# Patient Record
Sex: FEMALE
[De-identification: names, ages, dates, MRNs, and addresses within clinical notes are randomized per-mention and may not be internally consistent; named-entity substitution may affect disease eponyms.]

---

## 2019-12-03 ENCOUNTER — RECORDS - HEALTHEAST (OUTPATIENT)
Dept: ADMINISTRATIVE | Facility: OTHER | Age: 59
End: 2019-12-03

## 2020-01-02 ENCOUNTER — TELEPHONE (OUTPATIENT)
Dept: ONCOLOGY | Facility: CLINIC | Age: 60
End: 2020-01-02

## 2020-01-02 NOTE — TELEPHONE ENCOUNTER
"I called Mary to schedule a NP Onc appt as requested via [employer] from [place] (referrals) Mary did not answer & no voicemail available to leave a message. Pt is being referred by [facility] for \"malignant carcinoid tumor.\" I sent a letter requesting a call to schedule also.  "

## 2020-01-15 NOTE — TELEPHONE ENCOUNTER
ONCOLOGY INTAKE: Records Information      APPT INFORMATION: [id number] Dr. Carrington Arredondo  Referring provider:  Dr. Tavares Kirby  Referring provider s clinic:  [facility]  Reason for visit/diagnosis:  GI Neuroendocrine tumor  Has patient been notified of appointment date and time?: Yes    RECORDS INFORMATION:  Were the records received with the referral (via [employer])? No    Has patient been seen for any external appt for this diagnosis? Yes    If yes, where? [facility]    Has patient had any imaging or procedures outside of [facility] for this condition? Yes      If Yes, where? [facility]    ADDITIONAL INFORMATION:  None

## 2020-01-16 NOTE — TELEPHONE ENCOUNTER
Action    Action Taken January 16, 2020  LVM for PT to CB and verify records Hx  January 17, 2020  Call from Juan @ [facility] stating that all images will be uploaded and sent priority mail today and that I need to request pathology from [facility] as they don't have it there.  1:48 PM  CB from PT verifying all records are at [place] and that the recent PET was at [facility]     RECORDS STATUS - ALL OTHER DIAGNOSIS      RECORDS RECEIVED FROM: Baldo ([facility]); [facility]   DATE RECEIVED: Care Everywhere   NOTES STATUS DETAILS   OFFICE NOTE from referring provider     OFFICE NOTE from medical oncologist     DISCHARGE SUMMARY from hospital     DISCHARGE REPORT from the ER     OPERATIVE REPORT     MEDICATION LIST     CLINICAL TRIAL TREATMENTS TO DATE     LABS     PATHOLOGY REPORTS     ANYTHING RELATED TO DIAGNOSIS Re-requested 1/17/20  Received 1/29/20 [facility]   GENONOMIC TESTING     TYPE:     IMAGING (NEED IMAGES & REPORT)     CT SCANS Requested 1/16/20  Received - See note 1/24/20 [facility]    MRI Requested 1/16/20  Received - See note 1/24/20 [facility]    MAMMO     ULTRASOUND Requested 1/16/20  Received - See note 1/24/20 [facility]    PET Requested 1/16/20  2nd request on 1/29/20 [facility]

## 2020-01-24 ENCOUNTER — TELEPHONE (OUTPATIENT)
Dept: ONCOLOGY | Facility: CLINIC | Age: 60
End: 2020-01-24

## 2020-01-24 NOTE — TELEPHONE ENCOUNTER
Imaging disc arrived from [facility] 1/23/20 - given to Lionel to be uploaded into PACs on 1/24/20    -1/30/20: Images from [facility] resolved, and now viewable to PACS - [facility]  7:51 AM

## 2020-01-24 NOTE — TELEPHONE ENCOUNTER
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 Referring provider is Dr Gutierrez at [facility]. Liver bx Nov 2019 shows NET with GI origin, CT CAP showed intraheptic & intrathoracic adenopathy. Bronch with bx of intrathoracic LN negative for malignancy Nov 2019. Dotatate PET Jan 2020 at [facility] [place]. Pt confirms she started lanreotide at [place] on 1/6/20 & has surgical consult with Dr Regalado at [facility] 1/27.   Pt sees Dr Arredondo 2/6 for 2nd opinion; pt understands that we may also advocate for surgical consult here. Will consult with Onc team and contact pt only if further consults are ordered here. Pt has my direct# PRN.

## 2020-01-28 NOTE — TELEPHONE ENCOUNTER
Per Temi Stanton RNCC, pt saw Dr Regalado/Gen Surg 1/27 at [facility] & surgery was NOT recommended. Dr Gray agrees with Dr Regalado's recs. Pt to see Dr Arredondo 2/6, if Dr Arredondo feels surg onc consult is still warranted at [facility], he will send referral.

## 2020-01-30 ENCOUNTER — TELEPHONE (OUTPATIENT)
Dept: ONCOLOGY | Facility: CLINIC | Age: 60
End: 2020-01-30

## 2020-01-30 PROCEDURE — 00000346 ZZHCL STATISTIC REVIEW OUTSIDE SLIDES TC 88321: Performed by: INTERNAL MEDICINE

## 2020-01-30 NOTE — TELEPHONE ENCOUNTER
I called Mary to offer an appt for tomorrow 1/31/20 with Dr Arredondo as requested via IB from [place]. Mary did not answer so I left a voicemail stating there was an opening tomorrow, but did not leave details & requested a return call.

## 2020-01-31 LAB — COPATH REPORT: NORMAL

## 2020-02-06 ENCOUNTER — PRE VISIT (OUTPATIENT)
Dept: ONCOLOGY | Facility: CLINIC | Age: 60
End: 2020-02-06

## 2020-02-06 ENCOUNTER — ONCOLOGY VISIT (OUTPATIENT)
Dept: ONCOLOGY | Facility: CLINIC | Age: 60
End: 2020-02-06
Attending: INTERNAL MEDICINE
Payer: COMMERCIAL

## 2020-02-06 VITALS
HEART RATE: 83 BPM | RESPIRATION RATE: 16 BRPM | OXYGEN SATURATION: 98 % | HEIGHT: 63 IN | WEIGHT: 170.1 LBS | BODY MASS INDEX: 30.14 KG/M2 | DIASTOLIC BLOOD PRESSURE: 101 MMHG | SYSTOLIC BLOOD PRESSURE: 176 MMHG | TEMPERATURE: 98.3 F

## 2020-02-06 DIAGNOSIS — C7B.8 METASTATIC MALIGNANT NEUROENDOCRINE TUMOR TO LIVER (H): Primary | ICD-10-CM

## 2020-02-06 PROCEDURE — G0463 HOSPITAL OUTPT CLINIC VISIT: HCPCS | Mod: ZF

## 2020-02-06 PROCEDURE — 99204 OFFICE O/P NEW MOD 45 MIN: CPT | Mod: ZP | Performed by: INTERNAL MEDICINE

## 2020-02-06 RX ORDER — ACETAMINOPHEN 160 MG
2000 TABLET,DISINTEGRATING ORAL DAILY
COMMUNITY
Start: 2016-01-04

## 2020-02-06 RX ORDER — BIOTIN 5 MG
5 TABLET ORAL DAILY
COMMUNITY
Start: 2018-03-21

## 2020-02-06 RX ORDER — OMEGA-3 FATTY ACIDS/FISH OIL 300-1000MG
1000 CAPSULE ORAL DAILY
COMMUNITY
Start: 2016-01-04

## 2020-02-06 RX ORDER — METHYLPREDNISOLONE 4 MG
500 TABLET, DOSE PACK ORAL DAILY
COMMUNITY
Start: 2016-01-04

## 2020-02-06 RX ORDER — DIPHENOXYLATE HYDROCHLORIDE AND ATROPINE SULFATE 2.5; .025 MG/1; MG/1
1 TABLET ORAL DAILY
COMMUNITY

## 2020-02-06 ASSESSMENT — PAIN SCALES - GENERAL: PAINLEVEL: NO PAIN (0)

## 2020-02-06 ASSESSMENT — MIFFLIN-ST. JEOR: SCORE: 1310.7

## 2020-02-06 NOTE — NURSING NOTE
"Oncology Rooming Note    February 6, 2020 10:49 AM   Mary Grigsby is a 59 year old female who presents for:    Chief Complaint   Patient presents with     New Patient     NEW PT; 2ND OPINION ON GI NEUROENDOCRINE TUMOR WITH LIVER METS; VITALS TAKEN      Initial Vitals: BP (!) 176/101   Pulse 83   Temp 98.3  F (36.8  C) (Oral)   Resp 16   Ht 1.6 m (5' 3\")   Wt 77.2 kg (170 lb 1.6 oz)   SpO2 98%   Breastfeeding No   BMI 30.13 kg/m   Estimated body mass index is 30.13 kg/m  as calculated from the following:    Height as of this encounter: 1.6 m (5' 3\").    Weight as of this encounter: 77.2 kg (170 lb 1.6 oz). Body surface area is 1.85 meters squared.  No Pain (0) Comment: Data Unavailable   No LMP recorded. Patient is postmenopausal.  Allergies reviewed: Yes  Medications reviewed: Yes    Medications: Medication refills not needed today.  Pharmacy name entered into [facility]: [id number] [address] [facility] [address] [facility] [address]    Clinical concerns: No new concerns today.     Patient's blood pressure reading was given to provider. Patient states that since her diagnosis she has been having higher blood pressure readings. She does monitor them at home.  Dr Arredondo  was notified.      Martha Sharif              "

## 2020-02-06 NOTE — PATIENT INSTRUCTIONS
"Cont Lanreotide    We will get back to you after discussing with IR    Can also check 24 hr Urine 5HIAA to see if it would be useful to follow the disease    Patient Education     Honoring Choices - Your Rights: Making Your Own Health Care Treatment Decisions  [place] Law:  [place] law allows you to inform others of your health care wishes. You have the right to state your wishes or appoint an agent in writing so that others will know what you want if you can't tell them because of illness or injury. The information that follows tells about health care directives and how to prepare them. It does not give every detail of the law.  What is a health care directive?  A health care directive is a written document that informs others of your wishes about health care. It allows you to name a person (\"agent\") to decide for you if you are unable to decide. It also allows you to name an agent if you want someone else to decide for you while you still have capacity. You must be at least 18 years old to make a health care directive.  Why have a health care directive?  A health care directive is important if your attending physician determines you can't communicate your health care choices (because of physical or mental incapacity). It is also important if you wish to have someone else make your health care decisions. In some circumstances, your directive may state that you want someone other than an attending physician to decide when you cannot make your own decisions.  Must I have a health care directive? What happens if I don't have one?  You don't have to have a health care directive. But, writing one helps to make sure your wishes are followed. You will still receive medical treatment if you don't have a written directive. Health care providers will listen to what people close to you say about your treatment preferences, but the best way to be sure your wishes are followed is to have a health care directive.  How do I " make a health care directive?  There are forms for health care directives. You don't have to use a form, but your health care directive must meet the following requirements to be legal:    Be in writing, dated, and state your name.    Be signed by you or someone you authorize to sign for you when you can understand and communicate your health care wishes.    Have your signature verified by a  or two witnesses (notaries and witnesses cannot also be named as agent).    Include the appointment of an agent to make health care decisions for you and/or instructions about the health care choices you wish to make.  Before you prepare or revise your directive, you should discuss your health care wishes with your doctor or other health care provider. Information about where to get health care directive forms is given at the end of this document.  What can I put in a health care directive?  You have many choices of what to put in your health care directive. For example, you may include:    The person you trust as your agent to make health care decisions for you. You can name alternate agents, in case the first agent is unavailable, or joint agents.    Your goals, values, preferences, and cultural beliefs about health care.    The types of medical treatment you would want (or not want).    How you want your agent or agents to decide.    Where you want to receive care.    Instructions about artificial nutrition and hydration.    Mental health treatments that use electroshock therapy or neuroleptic medications.    Instructions if you are pregnant.    Donation of organs, tissues and eyes.     arrangements.    Who you would like as your guardian or conservator if there is a court action.  You may be as specific or as general as you wish. You can choose which issues or treatments to deal with in your health care directive.  Are there any limits to what I can put in my health care directive?  There are some limits  about what you can put in your health care directive. For instance:    Your agent must be at least 18 years of age.    Your agent cannot be your health care provider, unless the health care provider is a family member or you give reasons for the naming of the agent in your directive.    You cannot request health care treatment that is outside of reasonable medical practice.    You cannot request assisted suicide.  How long does a health care directive last? Can I change it?  Your health care directive lasts until you change or cancel it. As long as the changes meet the health care directive requirements listed above, you may cancel your directive by any of the following:    A written statement saying you want to cancel it    Destroying it    Telling at least two other people you want to cancel it    Writing a new health care directive.  What should I do with my health care directive after I have signed it?  You should inform others of your health care directive and give people copies of it. You may wish to inform family members, your health care agent or agents, and your health care providers that you have a health care directive. You should give them a copy. It's a good idea to review and update your directive as your needs change. Keep it in a safe place where it is easily found.   We are committed to making your health care wishes known. You may give a copy of your directive to any care team member or bring or mail a copy to any of our locations, and we will keep it in your medical record.  What if I've already prepared a health care document? Is it still good?  Before August 1, 1998, [place] law provided for several other types of directives, including living knight, durable health care londono of  and mental health declarations. The law changed so people can use one form for all their health care instructions. Forms created before August 1, 1998 are still legal if they followed the law in effect when  written. They are also legal if they meet the requirements of the new law (described above). You may want to review any existing documents to make sure they say what you want and meet all requirements.  I prepared my directive in another state. Is it still good?  Health care directives prepared in other states are legal if they meet the requirements of the other state's laws or the [place] requirements. But requests for assisted suicide will not be followed.  What if my health care provider refuses to follow my health care directive?  Your health care provider generally will follow your health care directive, or any instructions from your agent, as long as the health care follows reasonable medical practice. But, you or your agent cannot request treatment that will not help you or which the provider cannot provide. If the provider cannot follow the agent's directions about life-sustaining treatment, the provider must inform the agent. The provider must also document the notice in your medical record. The provider must allow the agency to arrange to transfer you to another provider who will follow the agent's directions.  What if I believe a health care provider has not followed health care directive requirements?  Complaints of this type can be filed with the Office of Health Facility Complaints at [phone] ([facility]) or toll free at [phone].  What if I believe a health plan has not followed health care directive requirements?  Complaints of this type can be filed with the [place] Health Information Clearinghouse at [phone] or toll free at [phone].  How to obtain more information  Ask any care team member for information, forms, or how to register for a free class on advance care planning and creating a health care directive. Or you may:   visit [email or web address].org/choices,   email [email or web address] or   call [phone].  Find other health care directive forms at [place]  Board on Aging's Senior LinkAge Line: www.[place]aging.netor call 1-822.911.9191.   For informational purposes only. Not to replace the advice of your health care provider.  Copyright   2012 [employer]. All rights reserved. TERMINALFOUR 1626 - REV 06/18.   For informational purposes only. Not to replace the advice of your health care provider.  Copyright   2018 [employer]. All rights reserved.

## 2020-02-06 NOTE — PROGRESS NOTES
Oncology initial visit:  Date on this visit: 2/6/2020    Mary Grigsby  is referred by Dr.Steven TYRA Kirby for an oncology consultation. She requires evaluation for new diagnosis of neuroendocrine tumor.    Primary Physician: No primary care provider on file.     History Of Present Illness:  Ms. Grigsby is a 59 year old female who was feeling well and went to her primary care physician for her regular visit and examination showed possibility of liver mass so she had anUltrasound of the abdomen done on 10/21/2019 which showed a large heterogeneous mass in the left lobe of the liver which measures a maximum of 11.6 cm in diameter.   MRI of the abdomen on 10/28/2019 showed 10.6 x 10.0 x 7.1 cm mass located predominantly in segment 3 of the liver which shows mild peripheral enhancement with internal hemorrhage. Multiple small scattered lesions throughout the liver measuring up to 1.2 cm show restricted diffusion with mild heterogeneous enhancement. These are also very suspicious for metastatic neuroendocrine tumor.  The biopsy from the liver were consistent with well-differentiated neuroendocrine tumor Grade 1 Ki-67 1%.      She had EBUS FNA from a stations 7, 10 R, 4R and 10 L and these showed noncaseating granulomatous inflammation but no malignancy.    PET Dotatate on 1/9/2020 showed focal intense small bowel tracer uptake corresponding to an enhancing nodule in the terminal ileum, consistent with neuroendocrine primary tumor.  Large necrotic mass in the left lobe of the liver, measuring up to 10 x 7 cm in size. This shows increased uptake at the periphery, particularly at the inferior aspect of the mass, and is consistent with a neuroendocrine metastasis. No definite metastatic lesion in the right lobe of liver.     Prominent mediastinal lymph nodes with low level tracer uptake inlcuding multiple right paratracheal lymph nodes, 1.4 cm prevascular lymph node, 2.1 cm subcarinal node, and right hilar lymph nodes measuring  up to 1.6 cm.     Baseline Chromagranin A  - 277 on 2020.    2020 started Lanreotide  2020 Saw Dr Regalado at [facility]. Surgery was not recommended.    She comes in today accompanied by her  and tells me that she has been feeling well.  She mentioned that for 1 day she was feeling somewhat fatigued so she made the doctor's appointment for annual physical exam and during the examination it was noted that she has fullness in the epigastrium so that is how ultrasound of the abdomen was ordered and then the diagnosis was made.  Otherwise she denies any pain.  No nausea vomiting diarrhea or constipation.  Denies any skin problems or skin flushing.  Denies any shortness of breath.  Her energy is good.  Denies any new swellings.  No issues with bleeding.  No neuropathy.  Denies fevers or infections.  No recent weight loss.  She was started on lanreotide on 2020 and the second dose was given on 2/3/2020.  She is tolerating it well.    ECOG 0    ROS:  A comprehensive ROS was otherwise neg      Past Medical/Surgical History:  No past medical history on file.  No past surgical history on file.    x3.  Vocal cord polyps which were removed in .    Cancer History:   As above    Allergies:  Allergies as of 2020 - Reviewed 2020   Allergen Reaction Noted     Hmg-coa-r inhibitors Other (See Comments) 10/23/2019     Current Medications:  Current Outpatient Medications   Medication Sig Dispense Refill     Biotin (SUPER BIOTIN) 5 MG TABS Take 5 mg by mouth daily       Cholecalciferol (VITAMIN D3) 50 MCG (2000 UT) CAPS Take 2,000 Units by mouth daily       Glucosamine Sulfate 500 MG TABS Take 500 mg by mouth daily       Multiple Vitamin (MULTI-VITAMINS) TABS Take 1 tablet by mouth daily       omega 3 1000 MG CAPS Take 1,000 capsules by mouth daily        Family History:  No family history on file.   Her father had mesothelioma.  He had asbestosis exposure.  paternal uncle had leukemia but she is  "not sure of the details.  Patient has 3 children who are healthy.     Social History:  Social History     Socioeconomic History     Marital status:      Spouse name: Not on file     Number of children: Not on file     Years of education: Not on file     Highest education level: Not on file   Occupational History     Not on file   Social Needs     Financial resource strain: Not on file     Food insecurity:     Worry: Not on file     Inability: Not on file     Transportation needs:     Medical: Not on file     Non-medical: Not on file   Tobacco Use     Smoking status: Never Smoker     Smokeless tobacco: Never Used   Substance and Sexual Activity     Alcohol use: Not on file     Drug use: Not on file     Sexual activity: Not on file   Lifestyle     Physical activity:     Days per week: Not on file     Minutes per session: Not on file     Stress: Not on file   Relationships     Social connections:     Talks on phone: Not on file     Gets together: Not on file     Attends Synagogue service: Not on file     Active member of club or organization: Not on file     Attends meetings of clubs or organizations: Not on file     Relationship status: Not on file     Intimate partner violence:     Fear of current or ex partner: Not on file     Emotionally abused: Not on file     Physically abused: Not on file     Forced sexual activity: Not on file   Other Topics Concern     Not on file   Social History Narrative     Not on file     She denies any smoking or alcohol.  She lives with her .  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      Physical Exam:  BP (!) 176/101   Pulse 83   Temp 98.3  F (36.8  C) (Oral)   Resp 16   Ht 1.6 m (5' 3\")   Wt 77.2 kg (170 lb 1.6 oz)   SpO2 98%   Breastfeeding No   BMI 30.13 kg/m     Wt Readings from Last 4 Encounters:   02/06/20 77.2 kg (170 lb 1.6 oz)     CONSTITUTIONAL: no acute distress  EYES: PERRLA, no palor or icterus.   ENT/MOUTH: no mouth lesions. Ears " normal  CVS: s1s2 no m r g .   RESPIRATORY: clear to auscultation b/l  GI: soft non tender I can feel the enlarged left hepatic lobe.  It is nontender. No splenomegaly  NEURO: AAOX3  Grossly non focal neuro exam  INTEGUMENT: no obvious rashes  LYMPHATIC: no palpable cervical, supraclavicular, axillary or inguinal LAD  MUSCULOSKELETAL: Unremarkable. No bony tenderness.   EXTREMITIES: no edema  PSYCH: Mentation, mood and affect are normal. Decision making capacity is intact    Laboratory/Imaging Studies      Reviewed      ASSESSMENT/PLAN:    She has well differentiated metastatic neuroendocrine tumor with terminal ileum being the likely primary site of origin and there is a large left hepatic lobe metastasis with a satellite nodule and there several smaller similar-appearing meta stasis in the right hepatic lobe.    She has enlarged mediastinal lymph nodes but the biopsy from these was nonmalignant consisting of noncaseating granulomatous inflammation.  She has been started on lanreotide as of 1/6/2020.    Otherwise she remains completely asymptomatic and has ECOG 0.  We discussed the situation in detail.  We discussed that generally it is a slow-growing tumor.  I have also discussed and reviewed her imaging with the radiologist.  She was seen by the surgeon and surgery was not recommended.  I also think that considering that she has several lesions in the right hepatic lobe as well, surgery is unlikely to be feasible.  That means that the disease would be not curable and the treatments would be palliative in nature.  I offered her that I can have her see her surgeon here at the [facility] if she is willing to have another opinion but at this time she is not very interested in that.  I would recommend continuing with lanreotide.  I have also asked IR to see if liver directed procedure like TACE or radio embolization could be done for the left lobe of the liver lesion which is the largest metastatic  lesion.  I have not heard back from IR so I will get back to her once I have any update.  I would also recommend continuing to follow chromogranin A and also check for 24-hour urine 5 HIAA because it might also prove to be useful.  Her current serum chromogranin A was 277 initially.      Discussion regarding health care directive  We discussed that it is important that she completes health care directive which would help in making sure that her wishes are followed about her treatment care in case she is not able to make a decision for herself.  We gave her information regarding that.    Elevated BP- follow with PCP    All questions answered and she and her  are agreeable and comfortable with the plan.    Carrington Arredondo MD

## 2020-02-06 NOTE — LETTER
RE: Mary Grigsby  [address] [place] [id number]     Dear Colleague,    Thank you for referring your patient, Mary Grigsby, to the [facility]. Please see a copy of my visit note below.    Oncology initial visit:  Date on this visit: 2/6/2020    Mary Grigsby  is referred by Dr.Steven TYRA Kirby for an oncology consultation. She requires evaluation for new diagnosis of neuroendocrine tumor.    Primary Physician: No primary care provider on file.     History Of Present Illness:  Ms. Grigsby is a 59 year old female who was feeling well and went to her primary care physician for her regular visit and examination showed possibility of liver mass so she had anUltrasound of the abdomen done on 10/21/2019 which showed a large heterogeneous mass in the left lobe of the liver which measures a maximum of 11.6 cm in diameter.   MRI of the abdomen on 10/28/2019 showed 10.6 x 10.0 x 7.1 cm mass located predominantly in segment 3 of the liver which shows mild peripheral enhancement with internal hemorrhage. Multiple small scattered lesions throughout the liver measuring up to 1.2 cm show restricted diffusion with mild heterogeneous enhancement. These are also very suspicious for metastatic neuroendocrine tumor.  The biopsy from the liver were consistent with well-differentiated neuroendocrine tumor Grade 1 Ki-67 1%.      She had EBUS FNA from a stations 7, 10 R, 4R and 10 L and these showed noncaseating granulomatous inflammation but no malignancy.    PET Dotatate on 1/9/2020 showed focal intense small bowel tracer uptake corresponding to an enhancing nodule in the terminal ileum, consistent with neuroendocrine primary tumor.  Large necrotic mass in the left lobe of the liver, measuring up to 10 x 7 cm in size. This shows increased uptake at the periphery, particularly at the inferior aspect of the mass, and is consistent with a neuroendocrine metastasis. No definite metastatic lesion in the right lobe  of liver.     Prominent mediastinal lymph nodes with low level tracer uptake inlcuding multiple right paratracheal lymph nodes, 1.4 cm prevascular lymph node, 2.1 cm subcarinal node, and right hilar lymph nodes measuring up to 1.6 cm.     Baseline Chromagranin A  - 277 on 2020.    2020 started Lanreotide  2020 Saw Dr Regalado at [facility]. Surgery was not recommended.    She comes in today accompanied by her  and tells me that she has been feeling well.  She mentioned that for 1 day she was feeling somewhat fatigued so she made the doctor's appointment for annual physical exam and during the examination it was noted that she has fullness in the epigastrium so that is how ultrasound of the abdomen was ordered and then the diagnosis was made.  Otherwise she denies any pain.  No nausea vomiting diarrhea or constipation.  Denies any skin problems or skin flushing.  Denies any shortness of breath.  Her energy is good.  Denies any new swellings.  No issues with bleeding.  No neuropathy.  Denies fevers or infections.  No recent weight loss.  She was started on lanreotide on 2020 and the second dose was given on 2/3/2020.  She is tolerating it well.    ECOG 0    ROS:  A comprehensive ROS was otherwise neg      Past Medical/Surgical History:  No past medical history on file.  No past surgical history on file.    x3.  Vocal cord polyps which were removed in .    Cancer History:   As above    Allergies:  Allergies as of 2020 - Reviewed 2020   Allergen Reaction Noted     Hmg-coa-r inhibitors Other (See Comments) 10/23/2019     Current Medications:  Current Outpatient Medications   Medication Sig Dispense Refill     Biotin (SUPER BIOTIN) 5 MG TABS Take 5 mg by mouth daily       Cholecalciferol (VITAMIN D3) 50 MCG (2000 UT) CAPS Take 2,000 Units by mouth daily       Glucosamine Sulfate 500 MG TABS Take 500 mg by mouth daily       Multiple Vitamin (MULTI-VITAMINS) TABS Take 1 tablet by  "mouth daily       omega 3 1000 MG CAPS Take 1,000 capsules by mouth daily        Family History:  No family history on file.   Her father had mesothelioma.  He had asbestosis exposure.  paternal uncle had leukemia but she is not sure of the details.  Patient has 3 children who are healthy.     Social History:  Social History     Socioeconomic History     Marital status:      Spouse name: Not on file     Number of children: Not on file     Years of education: Not on file     Highest education level: Not on file   Occupational History     Not on file   Social Needs     Financial resource strain: Not on file     Food insecurity:     Worry: Not on file     Inability: Not on file     Transportation needs:     Medical: Not on file     Non-medical: Not on file   Tobacco Use     Smoking status: Never Smoker     Smokeless tobacco: Never Used   Substance and Sexual Activity     Alcohol use: Not on file     Drug use: Not on file     Sexual activity: Not on file   Lifestyle     Physical activity:     Days per week: Not on file     Minutes per session: Not on file     Stress: Not on file   Relationships     Social connections:     Talks on phone: Not on file     Gets together: Not on file     Attends Je[employer] witness service: Not on file     Active member of club or organization: Not on file     Attends meetings of clubs or organizations: Not on file     Relationship status: Not on file     Intimate partner violence:     Fear of current or ex partner: Not on file     Emotionally abused: Not on file     Physically abused: Not on file     Forced sexual activity: Not on file   Other Topics Concern     Not on file   Social History Narrative     Not on file     She denies any smoking or alcohol.  She lives with her .  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      Physical Exam:  BP (!) 176/101   Pulse 83   Temp 98.3  F (36.8  C) (Oral)   Resp 16   Ht 1.6 m (5' 3\")   Wt 77.2 kg (170 lb 1.6 oz)   SpO2 " 98%   Breastfeeding No   BMI 30.13 kg/m      Wt Readings from Last 4 Encounters:   02/06/20 77.2 kg (170 lb 1.6 oz)     CONSTITUTIONAL: no acute distress  EYES: PERRLA, no palor or icterus.   ENT/MOUTH: no mouth lesions. Ears normal  CVS: s1s2 no m r g .   RESPIRATORY: clear to auscultation b/l  GI: soft non tender I can feel the enlarged left hepatic lobe.  It is nontender. No splenomegaly  NEURO: AAOX3  Grossly non focal neuro exam  INTEGUMENT: no obvious rashes  LYMPHATIC: no palpable cervical, supraclavicular, axillary or inguinal LAD  MUSCULOSKELETAL: Unremarkable. No bony tenderness.   EXTREMITIES: no edema  PSYCH: Mentation, mood and affect are normal. Decision making capacity is intact    Laboratory/Imaging Studies      Reviewed      ASSESSMENT/PLAN:    She has well differentiated metastatic neuroendocrine tumor with terminal ileum being the likely primary site of origin and there is a large left hepatic lobe metastasis with a satellite nodule and there several smaller similar-appearing meta stasis in the right hepatic lobe.    She has enlarged mediastinal lymph nodes but the biopsy from these was nonmalignant consisting of noncaseating granulomatous inflammation.  She has been started on lanreotide as of 1/6/2020.    Otherwise she remains completely asymptomatic and has ECOG 0.  We discussed the situation in detail.  We discussed that generally it is a slow-growing tumor.  I have also discussed and reviewed her imaging with the radiologist.  She was seen by the surgeon and surgery was not recommended.  I also think that considering that she has several lesions in the right hepatic lobe as well, surgery is unlikely to be feasible.  That means that the disease would be not curable and the treatments would be palliative in nature.  I offered her that I can have her see her surgeon here at the [facility] if she is willing to have another opinion but at this time she is not very interested in  that.  I would recommend continuing with lanreotide.  I have also asked IR to see if liver directed procedure like TACE or radio embolization could be done for the left lobe of the liver lesion which is the largest metastatic lesion.  I have not heard back from IR so I will get back to her once I have any update.  I would also recommend continuing to follow chromogranin A and also check for 24-hour urine 5 HIAA because it might also prove to be useful.  Her current serum chromogranin A was 277 initially.      Discussion regarding health care directive  We discussed that it is important that she completes health care directive which would help in making sure that her wishes are followed about her treatment care in case she is not able to make a decision for herself.  We gave her information regarding that.    Elevated BP- follow with PCP    All questions answered and she and her  are agreeable and comfortable with the plan.    Carrington Arredondo MD

## 2020-02-10 ENCOUNTER — PATIENT OUTREACH (OUTPATIENT)
Dept: ONCOLOGY | Facility: CLINIC | Age: 60
End: 2020-02-10

## 2020-02-10 ENCOUNTER — TELEPHONE (OUTPATIENT)
Dept: INTERVENTIONAL RADIOLOGY/VASCULAR | Facility: CLINIC | Age: 60
End: 2020-02-10

## 2020-02-10 ENCOUNTER — TELEPHONE (OUTPATIENT)
Dept: VASCULAR SURGERY | Facility: CLINIC | Age: 60
End: 2020-02-10

## 2020-02-10 NOTE — PROGRESS NOTES
M for Mary updating her that the IR team will be calling her to make an appointment with Dr Palm to discuss Liver Directed Therapy.  Left detailed call back information for additional questions or concerns.

## 2020-02-10 NOTE — TELEPHONE ENCOUNTER
LM for patient regarding an up coming appointment with Dr. Palm on Monday 2/17/20 @ 12:40.  Call back number was provided.  Itinerary sent    Lisa Blake LPN

## 2020-02-10 NOTE — TELEPHONE ENCOUNTER
Please set pt up for new pt consult with Dr Jose Palm,     Referring Dr Arredondo,     Put pt on for 2/17 ok to over book

## 2020-02-12 NOTE — TELEPHONE ENCOUNTER
DIAGNOSIS: [place] embolization consult referred by Dr. Arredondo   DATE RECEIVED: 2.17.20   NOTES STATUS DETAILS   OFFICE NOTE from referring provider Internal 2.6.20 Dr. Arredondo   OFFICE NOTE from other specialist Care Everywhere 2.7.20, 2.3.20. 1.6.20 + more visits with Dr. Tavares Kirby   OPERATIVE REPORT N/A    MEDICATION LIST Internal    PERTINENT LABS Care Everywhere    CTA (CT ANGIOGRAPHY) N/A    CT In Pacs Chest/Abd- 11.15.19  PET- 1.9.20   MRI In Pacs Abd- 10.28.19   ULTRASOUND In Pacs Liver Bx- 11.5.19  Abd- 10.21.19

## 2020-02-17 ENCOUNTER — PRE VISIT (OUTPATIENT)
Dept: VASCULAR SURGERY | Facility: CLINIC | Age: 60
End: 2020-02-17